# Patient Record
Sex: MALE | Race: WHITE | NOT HISPANIC OR LATINO | Employment: FULL TIME | URBAN - METROPOLITAN AREA
[De-identification: names, ages, dates, MRNs, and addresses within clinical notes are randomized per-mention and may not be internally consistent; named-entity substitution may affect disease eponyms.]

---

## 2023-09-03 ENCOUNTER — HOSPITAL ENCOUNTER (OUTPATIENT)
Dept: DATA CONVERSION | Facility: HOSPITAL | Age: 38
Discharge: HOME | End: 2023-09-04
Payer: MEDICAID

## 2023-09-03 DIAGNOSIS — R74.01 ELEVATION OF LEVELS OF LIVER TRANSAMINASE LEVELS: ICD-10-CM

## 2023-09-03 DIAGNOSIS — R20.0 ANESTHESIA OF SKIN: ICD-10-CM

## 2023-09-03 DIAGNOSIS — R73.9 HYPERGLYCEMIA, UNSPECIFIED: ICD-10-CM

## 2023-09-03 DIAGNOSIS — N48.9 DISORDER OF PENIS, UNSPECIFIED: ICD-10-CM

## 2023-09-03 DIAGNOSIS — E66.01 MORBID (SEVERE) OBESITY DUE TO EXCESS CALORIES (MULTI): ICD-10-CM

## 2023-09-03 DIAGNOSIS — G62.9 POLYNEUROPATHY, UNSPECIFIED: ICD-10-CM

## 2023-09-03 DIAGNOSIS — Z87.891 PERSONAL HISTORY OF NICOTINE DEPENDENCE: ICD-10-CM

## 2023-09-04 LAB
ALBUMIN SERPL-MCNC: 4.2 GM/DL (ref 3.5–5)
ALBUMIN/GLOB SERPL: 1.2 RATIO (ref 1.5–3)
ALP BLD-CCNC: 104 U/L (ref 35–125)
ALT SERPL-CCNC: 46 U/L (ref 5–40)
ANION GAP SERPL CALCULATED.3IONS-SCNC: 14 MMOL/L (ref 0–19)
AST SERPL-CCNC: 48 U/L (ref 5–40)
BACTERIA SPEC CULT: NORMAL
BACTERIA UR QL AUTO: NEGATIVE
BASOPHILS # BLD AUTO: 0.04 K/UL (ref 0–0.22)
BASOPHILS NFR BLD AUTO: 0.6 % (ref 0–1)
BILIRUB SERPL-MCNC: 0.5 MG/DL (ref 0.1–1.2)
BILIRUB UR QL STRIP.AUTO: NEGATIVE
BUN SERPL-MCNC: 8 MG/DL (ref 8–25)
BUN/CREAT SERPL: 8.9 RATIO (ref 8–21)
CALCIUM SERPL-MCNC: 9.2 MG/DL (ref 8.5–10.4)
CC # UR: NORMAL /UL
CHLORIDE SERPL-SCNC: 102 MMOL/L (ref 97–107)
CLARITY UR: CLEAR
CO2 SERPL-SCNC: 20 MMOL/L (ref 24–31)
COLOR UR: YELLOW
CREAT SERPL-MCNC: 0.9 MG/DL (ref 0.4–1.6)
DEPRECATED RDW RBC AUTO: 33.3 FL (ref 37–54)
DIFFERENTIAL METHOD BLD: ABNORMAL
EOSINOPHIL # BLD AUTO: 0.04 K/UL (ref 0–0.45)
EOSINOPHIL NFR BLD: 0.6 % (ref 0–3)
ERYTHROCYTE [DISTWIDTH] IN BLOOD BY AUTOMATED COUNT: 17.6 % (ref 11.7–15)
GFR SERPL CREATININE-BSD FRML MDRD: 111 ML/MIN/1.73 M2
GLOBULIN SER-MCNC: 3.6 G/DL (ref 1.9–3.7)
GLUCOSE SERPL-MCNC: 273 MG/DL (ref 65–99)
GLUCOSE UR STRIP.AUTO-MCNC: 1000 MG/DL
HCT VFR BLD AUTO: 43 % (ref 41–50)
HGB BLD-MCNC: 13.4 GM/DL (ref 13.5–16.5)
HGB UR QL STRIP.AUTO: 2 /HPF (ref 0–3)
HGB UR QL: ABNORMAL
HYALINE CASTS UR QL AUTO: 20 /LPF
IMM GRANULOCYTES # BLD AUTO: 0.01 K/UL (ref 0–0.1)
KETONES UR QL STRIP.AUTO: ABNORMAL
LEUKOCYTE ESTERASE UR QL STRIP.AUTO: ABNORMAL
LYMPHOCYTES # BLD AUTO: 2.31 K/UL (ref 1.2–3.2)
LYMPHOCYTES NFR BLD MANUAL: 33.8 % (ref 20–40)
MCH RBC QN AUTO: 19.2 PG (ref 26–34)
MCHC RBC AUTO-ENTMCNC: 31.2 % (ref 31–37)
MCV RBC AUTO: 61.7 FL (ref 80–100)
MICROCYTES BLD QL SMEAR: ABNORMAL
MICROSCOPIC (UA): ABNORMAL
MONOCYTES # BLD AUTO: 0.59 K/UL (ref 0–0.8)
MONOCYTES NFR BLD MANUAL: 8.6 % (ref 0–8)
NEUTROPHILS # BLD AUTO: 3.85 K/UL
NEUTROPHILS # BLD AUTO: 3.85 K/UL (ref 1.8–7.7)
NEUTROPHILS.IMMATURE NFR BLD: 0.1 % (ref 0–1)
NEUTS SEG NFR BLD: 56.3 % (ref 50–70)
NITRITE UR QL STRIP.AUTO: NEGATIVE
NRBC BLD-RTO: 0 /100 WBC
PH UR STRIP.AUTO: 6 [PH] (ref 4.6–8)
PLATELET # BLD AUTO: 254 K/UL (ref 150–450)
PLATELET BLD QL SMEAR: ABNORMAL
PMV BLD AUTO: 10.3 CU (ref 7–12.6)
POTASSIUM SERPL-SCNC: 3.6 MMOL/L (ref 3.4–5.1)
PROT SERPL-MCNC: 7.8 G/DL (ref 5.9–7.9)
PROT UR STRIP.AUTO-MCNC: 50 MG/DL
RBC # BLD AUTO: 6.97 M/UL (ref 4.5–5.5)
REPORT STATUS -LH SQ DATA CONVERSION: NORMAL
SERVICE CMNT-IMP: NORMAL
SODIUM SERPL-SCNC: 136 MMOL/L (ref 133–145)
SP GR UR STRIP.AUTO: 1.02 (ref 1–1.03)
SPECIMEN SOURCE: NORMAL
SQUAMOUS UR QL AUTO: ABNORMAL /HPF
URINE CULTURE: ABNORMAL
UROBILINOGEN UR QL STRIP.AUTO: NORMAL MG/DL (ref 0–1)
WBC # BLD AUTO: 6.8 K/UL (ref 4.5–11)
WBC #/AREA URNS AUTO: 9 /HPF (ref 0–3)
WBC MORPH BLD: ABNORMAL

## 2023-09-06 ENCOUNTER — LAB (OUTPATIENT)
Dept: LAB | Facility: LAB | Age: 38
End: 2023-09-06
Payer: MEDICAID

## 2023-09-06 ENCOUNTER — APPOINTMENT (OUTPATIENT)
Dept: PRIMARY CARE | Facility: CLINIC | Age: 38
End: 2023-09-06
Payer: MEDICAID

## 2023-09-06 ENCOUNTER — OFFICE VISIT (OUTPATIENT)
Dept: PRIMARY CARE | Facility: CLINIC | Age: 38
End: 2023-09-06
Payer: MEDICAID

## 2023-09-06 VITALS
HEIGHT: 68 IN | WEIGHT: 315 LBS | BODY MASS INDEX: 47.74 KG/M2 | DIASTOLIC BLOOD PRESSURE: 82 MMHG | SYSTOLIC BLOOD PRESSURE: 144 MMHG

## 2023-09-06 DIAGNOSIS — Z13.220 LIPID SCREENING: ICD-10-CM

## 2023-09-06 DIAGNOSIS — E55.9 VITAMIN D DEFICIENCY: ICD-10-CM

## 2023-09-06 DIAGNOSIS — E03.9 HYPOTHYROIDISM (ACQUIRED): ICD-10-CM

## 2023-09-06 DIAGNOSIS — R53.83 OTHER FATIGUE: ICD-10-CM

## 2023-09-06 DIAGNOSIS — E11.65 CONTROLLED TYPE 2 DIABETES MELLITUS WITH HYPERGLYCEMIA, WITHOUT LONG-TERM CURRENT USE OF INSULIN (MULTI): ICD-10-CM

## 2023-09-06 PROBLEM — F12.10 MARIJUANA ABUSE: Status: ACTIVE | Noted: 2023-09-06

## 2023-09-06 PROBLEM — E66.01 MORBID (SEVERE) OBESITY DUE TO EXCESS CALORIES (MULTI): Status: ACTIVE | Noted: 2023-09-06

## 2023-09-06 LAB
ALANINE AMINOTRANSFERASE (SGPT) (U/L) IN SER/PLAS: 45 U/L (ref 10–52)
ALBUMIN (G/DL) IN SER/PLAS: 4.2 G/DL (ref 3.4–5)
ALKALINE PHOSPHATASE (U/L) IN SER/PLAS: 82 U/L (ref 33–120)
ANION GAP IN SER/PLAS: 15 MMOL/L (ref 10–20)
APPEARANCE, URINE: ABNORMAL
ASPARTATE AMINOTRANSFERASE (SGOT) (U/L) IN SER/PLAS: 42 U/L (ref 9–39)
BILIRUBIN TOTAL (MG/DL) IN SER/PLAS: 0.8 MG/DL (ref 0–1.2)
BILIRUBIN, URINE: NEGATIVE
BLOOD, URINE: ABNORMAL
CALCIDIOL (25 OH VITAMIN D3) (NG/ML) IN SER/PLAS: 30 NG/ML
CALCIUM (MG/DL) IN SER/PLAS: 9.6 MG/DL (ref 8.6–10.6)
CARBON DIOXIDE, TOTAL (MMOL/L) IN SER/PLAS: 24 MMOL/L (ref 21–32)
CHLORIDE (MMOL/L) IN SER/PLAS: 105 MMOL/L (ref 98–107)
CHOLESTEROL (MG/DL) IN SER/PLAS: 105 MG/DL (ref 0–199)
CHOLESTEROL IN HDL (MG/DL) IN SER/PLAS: 28.6 MG/DL
CHOLESTEROL/HDL RATIO: 3.7
COBALAMIN (VITAMIN B12) (PG/ML) IN SER/PLAS: 1027 PG/ML (ref 211–911)
COLOR, URINE: YELLOW
CREATININE (MG/DL) IN SER/PLAS: 0.77 MG/DL (ref 0.5–1.3)
ERYTHROCYTE DISTRIBUTION WIDTH (RATIO) BY AUTOMATED COUNT: 17.4 % (ref 11.5–14.5)
ERYTHROCYTE MEAN CORPUSCULAR HEMOGLOBIN CONCENTRATION (G/DL) BY AUTOMATED: 31 G/DL (ref 32–36)
ERYTHROCYTE MEAN CORPUSCULAR VOLUME (FL) BY AUTOMATED COUNT: 64 FL (ref 80–100)
ERYTHROCYTES (10*6/UL) IN BLOOD BY AUTOMATED COUNT: 6.63 X10E12/L (ref 4.5–5.9)
ESTIMATED AVERAGE GLUCOSE FOR HBA1C: 318 MG/DL
GFR MALE: >90 ML/MIN/1.73M2
GLUCOSE (MG/DL) IN SER/PLAS: 232 MG/DL (ref 74–99)
GLUCOSE, URINE: ABNORMAL MG/DL
HEMATOCRIT (%) IN BLOOD BY AUTOMATED COUNT: 42.2 % (ref 41–52)
HEMOGLOBIN (G/DL) IN BLOOD: 13.1 G/DL (ref 13.5–17.5)
HEMOGLOBIN A1C/HEMOGLOBIN TOTAL IN BLOOD: 12.7 %
KETONES, URINE: ABNORMAL MG/DL
LDL: 50 MG/DL (ref 0–99)
LEUKOCYTE ESTERASE, URINE: ABNORMAL
LEUKOCYTES (10*3/UL) IN BLOOD BY AUTOMATED COUNT: 5.8 X10E9/L (ref 4.4–11.3)
MUCUS, URINE: ABNORMAL /LPF
NITRITE, URINE: NEGATIVE
NRBC (PER 100 WBCS) BY AUTOMATED COUNT: 0 /100 WBC (ref 0–0)
PH, URINE: 5 (ref 5–8)
PLATELETS (10*3/UL) IN BLOOD AUTOMATED COUNT: 280 X10E9/L (ref 150–450)
POTASSIUM (MMOL/L) IN SER/PLAS: 4.4 MMOL/L (ref 3.5–5.3)
PROTEIN TOTAL: 7 G/DL (ref 6.4–8.2)
PROTEIN, URINE: ABNORMAL MG/DL
RBC, URINE: 12 /HPF (ref 0–5)
SODIUM (MMOL/L) IN SER/PLAS: 140 MMOL/L (ref 136–145)
SPECIFIC GRAVITY, URINE: 1.01 (ref 1–1.03)
SQUAMOUS EPITHELIAL CELLS, URINE: 7 /HPF
THYROTROPIN (MIU/L) IN SER/PLAS BY DETECTION LIMIT <= 0.05 MIU/L: 5.97 MIU/L (ref 0.44–3.98)
TRIGLYCERIDE (MG/DL) IN SER/PLAS: 134 MG/DL (ref 0–149)
UREA NITROGEN (MG/DL) IN SER/PLAS: 7 MG/DL (ref 6–23)
UROBILINOGEN, URINE: <2 MG/DL (ref 0–1.9)
VLDL: 27 MG/DL (ref 0–40)
WBC, URINE: 10 /HPF (ref 0–5)

## 2023-09-06 PROCEDURE — 85027 COMPLETE CBC AUTOMATED: CPT

## 2023-09-06 PROCEDURE — 83036 HEMOGLOBIN GLYCOSYLATED A1C: CPT

## 2023-09-06 PROCEDURE — 36415 COLL VENOUS BLD VENIPUNCTURE: CPT

## 2023-09-06 PROCEDURE — 82306 VITAMIN D 25 HYDROXY: CPT

## 2023-09-06 PROCEDURE — 80053 COMPREHEN METABOLIC PANEL: CPT

## 2023-09-06 PROCEDURE — 3077F SYST BP >= 140 MM HG: CPT | Performed by: INTERNAL MEDICINE

## 2023-09-06 PROCEDURE — 3079F DIAST BP 80-89 MM HG: CPT | Performed by: INTERNAL MEDICINE

## 2023-09-06 PROCEDURE — 82607 VITAMIN B-12: CPT

## 2023-09-06 PROCEDURE — 99203 OFFICE O/P NEW LOW 30 MIN: CPT | Performed by: INTERNAL MEDICINE

## 2023-09-06 PROCEDURE — 84443 ASSAY THYROID STIM HORMONE: CPT

## 2023-09-06 PROCEDURE — 80061 LIPID PANEL: CPT

## 2023-09-06 PROCEDURE — 3046F HEMOGLOBIN A1C LEVEL >9.0%: CPT | Performed by: INTERNAL MEDICINE

## 2023-09-06 PROCEDURE — 81001 URINALYSIS AUTO W/SCOPE: CPT

## 2023-09-06 RX ORDER — LEVOTHYROXINE SODIUM 150 UG/1
TABLET ORAL EVERY 24 HOURS
COMMUNITY
End: 2023-10-04 | Stop reason: SDUPTHER

## 2023-09-06 ASSESSMENT — ENCOUNTER SYMPTOMS
OCCASIONAL FEELINGS OF UNSTEADINESS: 0
DEPRESSION: 0
LOSS OF SENSATION IN FEET: 0

## 2023-09-06 NOTE — LETTER
September 6, 2023     Patient: Fransisco Zuniga   YOB: 1985   Date of Visit: 9/6/2023       To Whom It May Concern:    Fransisco Zuniga was seen in my clinic on 9/6/2023 at 9:15 am. Please excuse Fransisco for his absence from work on this day to make the appointment.    If you have any questions or concerns, please don't hesitate to call.         Sincerely,         Nasra Romano MD

## 2023-09-07 NOTE — PROGRESS NOTES
OFFICE NOTE    NAME OF THE PATIENT: Fransisco Zuniga    YOB: 1965    CHIEF COMPLAINT:  This 38-year-old white man today came to my office first time.  I welcomed him.  He used to see Dr. He.  He switched because Dr. He left.  He wants to establish new physician.  He has polydipsia, polyuria, neuropathy.  He wants to get tested for diabetes.  He snores at night, increased daytime somnolence, tired, fatigued, exhausted, he cannot lose weight.    PAST MEDICAL HISTORY:  Reviewed from EMR.  Non-Hodgkin's lymphoma diagnosed 19 years ago, he used to see Dr. Quevedo, hypothyroid from radiation.    CURRENT MEDICATIONS:  Reviewed from EMR.  Only one, Synthroid 150 mcg.    ALLERGIES:  Reviewed from EMR.    SOCIAL HISTORY:  Reviewed from EMR.  He does not smoke, does not drink alcohol.  Occupation, he works at [employer].  Single, no children.    FAMILY HISTORY:  Reviewed from EMR.  Father cancer prostate, mother diabetic.    OPERATIONS:  Gallbladder.    IMMUNIZATION:  Tetanus within the last 10 years    REVIEW OF SYSTEMS:  All 12 systems reviewed and pertaining covered in history and physical.    PHYSICAL EXAMINATION  VITAL SIGNS:  As recorded and reviewed from EMR.  EYES:  The patient's sclerae white.  The pupils were equal and round.  ENT:  The patient's external ears were normal and the otoscopic examination was negative.  NECK:  Supple.  There were no palpable masses.  Thyroid was not enlarged and there were no carotid bruits.  RESPIRATORY:  The patient had normal inspirations and expirations.  The breath sounds were equal bilaterally and clear to auscultation.  CARDIOVASCULAR:  The patient had S1 normal, split S2 without obvious rubs, clicks, or murmurs.  GASTROINTESTINAL:  There was no hepatosplenomegaly.  There were no palpable masses and no inguinal nodes.  LYMPHATIC:  The patient had no axillary, groin, or lymphadenopathy.  MUSCULOSKELETAL:  The patient had normal gait.  The joints appeared  "to be normal without evidence of deformity.  Grossly the muscles had good range of motion and were without effusion.  EXTREMITIES:  There was no evidence of peripheral edema.  NEUROLOGIC:  The patient had normal cranial nerves.  The reflexes, sensory, and motor examination were grossly within normal limits.  PSYCHIATRIC:  The patient had normal judgment and insight.  The patient was oriented to person, place, and time, and had no obvious mood defect including depression, anxiety, and/or agitation.  RECTAL:  Deferred by the patient.  GENITAL:  Deferred by the patient.    LAB WORK:  Laboratory testing discussed.    ASSESSMENT AND PLAN:  Polydipsia, polyuria.  I am going to check for diabetes.  Hypothyroid.  We will check TSH.  Sleep apnea, tired, fatigued, exhausted.  Home sleep test ordered.  Non-Hodgkin's lymphoma, beyond scope of my practice.  It has a bearing to his life.  He must follow with Dr. Quevedo regularly.  He understood.  Complete blood work ordered.  Welcome to my office.  See him in a week.    Kindly review this note in conjunction with EMR.   Subjective   Patient ID: Fransisco Zuniga is a 38 y.o. male who presents for New Patient Visit (/).      HPI    Review of Systems    Objective   /82   Ht 1.727 m (5' 8\")   Wt (!) 158 kg (348 lb)   BMI 52.91 kg/m²       Physical Exam    Assessment/Plan   Problem List Items Addressed This Visit       Controlled type 2 diabetes mellitus with hyperglycemia, without long-term current use of insulin (CMS/[facility])    Relevant Orders    Hemoglobin A1c (Completed)    Hypothyroidism (acquired)    Relevant Orders    TSH     Other Visit Diagnoses       Other fatigue        Relevant Orders    CBC (Completed)    Urinalysis with Reflex Microscopic (Completed)    Vitamin B12    Lipid screening        Relevant Orders    Comprehensive metabolic panel    Lipid panel    Vitamin D deficiency        Relevant Orders    Vitamin D 25-Hydroxy,Total (for eval of Vitamin D levels)      "

## 2023-09-21 ENCOUNTER — TELEPHONE (OUTPATIENT)
Dept: PRIMARY CARE | Facility: CLINIC | Age: 38
End: 2023-09-21
Payer: MEDICAID

## 2023-09-26 ENCOUNTER — TELEPHONE (OUTPATIENT)
Dept: PRIMARY CARE | Facility: CLINIC | Age: 38
End: 2023-09-26
Payer: MEDICAID

## 2023-10-04 DIAGNOSIS — E03.9 HYPOTHYROIDISM (ACQUIRED): ICD-10-CM

## 2023-10-04 RX ORDER — LEVOTHYROXINE SODIUM 150 UG/1
150 TABLET ORAL
Qty: 90 TABLET | Refills: 0 | Status: SHIPPED | OUTPATIENT
Start: 2023-10-04

## 2024-01-09 DIAGNOSIS — E03.9 HYPOTHYROIDISM (ACQUIRED): ICD-10-CM

## 2024-01-10 RX ORDER — LEVOTHYROXINE SODIUM 150 UG/1
150 TABLET ORAL DAILY
Qty: 90 TABLET | Refills: 1 | OUTPATIENT
Start: 2024-01-10